# Patient Record
(demographics unavailable — no encounter records)

---

## 2018-11-20 NOTE — CT
CT ABDOMEN AND PELVIS WITH ORAL AND IV CONTRAST:

 

HISTORY: 

A 54-year-old female with right upper quadrant pain.  Crohn's disease.  Small bowel resection x 2, ap
pendectomy, and cholecystitis.

 

COMPARISON: 

None.

 

FINDINGS: 

The lung bases are clear.  There are postop changes of cholecystectomy, appendectomy, and probably hy
sterectomy.  The liver, pancreas, spleen, adrenal glands, and left kidney are normal.  There is a tin
y nonobstructing calculus in the right kidney.  There are a couple of small low-density lesions in th
e right kidney, likely cysts.

 

No free air, free fluid, or lymphadenopathy is seen in the abdomen or pelvis.  The small bowel loops 
are not abnormally dilated.  No small bowel wall thickening is seen.  There are degenerative changes 
in the spine.  No aneurysmal dilatation of the abdominal aorta is seen.  A few sigmoid diverticula ar
e present.  

 

IMPRESSION: 

No acute process.

 

POS: [facility]

## 2019-08-22 NOTE — MMO
Bilateral MAMMO Bilat Screen [facility].

 

CLINICAL HISTORY:

Patient is 55 years old and is seen for screening. The patient has the following

family history of breast cancer:  maternal grandmother, malignant (generic);

sister, at age 42 and maternal aunt.  The patient has no personal history of

cancer.

 

VIEWS:

The views performed were:  bilateral craniocaudal and bilateral mediolateral

oblique.

 

This study has been interpreted with the assistance of computer-aided detection.

 

MAMMOGRAM FINDINGS:

There are scattered fibroglandular densities.

 

There are no suspicious masses, suspicious calcifications, or new areas of

architectural distortion.

 

IMPRESSION:

THERE IS NO MAMMOGRAPHIC EVIDENCE OF MALIGNANCY.

 

A ROUTINE FOLLOW-UP MAMMOGRAM IN 1 YEAR IS RECOMMENDED.

 

ACR BI-RADS Category 1 - Negative

 

MAMMOGRAPHY NOTE:

 1. A negative mammogram report should not delay a biopsy if a dominant of

 clinically suspicious mass is present.

 2. Approximately 10% to 15% of breast cancers are not detected by

 mammography.

 3. Adenosis and dense breasts may obscure an underlying neoplasm.

 

 

Reported by: SHANTE FRANCIS MD

Electonically Signed: [id number]